# Patient Record
Sex: FEMALE | Race: WHITE | Employment: OTHER | URBAN - METROPOLITAN AREA
[De-identification: names, ages, dates, MRNs, and addresses within clinical notes are randomized per-mention and may not be internally consistent; named-entity substitution may affect disease eponyms.]

---

## 2017-02-17 DIAGNOSIS — Z00.00 ROUTINE GENERAL MEDICAL EXAMINATION AT A HEALTH CARE FACILITY: ICD-10-CM

## 2017-02-17 LAB
A/G RATIO: 1.5 (ref 1.1–2.2)
ALBUMIN SERPL-MCNC: 4.5 G/DL (ref 3.4–5)
ALP BLD-CCNC: 72 U/L (ref 40–129)
ALT SERPL-CCNC: 16 U/L (ref 10–40)
ANION GAP SERPL CALCULATED.3IONS-SCNC: 14 MMOL/L (ref 3–16)
AST SERPL-CCNC: 19 U/L (ref 15–37)
BASOPHILS ABSOLUTE: 0 K/UL (ref 0–0.2)
BASOPHILS RELATIVE PERCENT: 0.9 %
BILIRUB SERPL-MCNC: <0.2 MG/DL (ref 0–1)
BUN BLDV-MCNC: 14 MG/DL (ref 7–20)
CALCIUM SERPL-MCNC: 9.6 MG/DL (ref 8.3–10.6)
CHLORIDE BLD-SCNC: 102 MMOL/L (ref 99–110)
CHOLESTEROL, TOTAL: 250 MG/DL (ref 0–199)
CO2: 27 MMOL/L (ref 21–32)
CREAT SERPL-MCNC: 0.8 MG/DL (ref 0.6–1.1)
EOSINOPHILS ABSOLUTE: 0.2 K/UL (ref 0–0.6)
EOSINOPHILS RELATIVE PERCENT: 4.4 %
GFR AFRICAN AMERICAN: >60
GFR NON-AFRICAN AMERICAN: >60
GLOBULIN: 3 G/DL
GLUCOSE BLD-MCNC: 96 MG/DL (ref 70–99)
HCT VFR BLD CALC: 43.6 % (ref 36–48)
HDLC SERPL-MCNC: 63 MG/DL (ref 40–60)
HEMOGLOBIN: 14.4 G/DL (ref 12–16)
HEPATITIS C ANTIBODY INTERPRETATION: NORMAL
LDL CHOLESTEROL CALCULATED: 173 MG/DL
LYMPHOCYTES ABSOLUTE: 1.8 K/UL (ref 1–5.1)
LYMPHOCYTES RELATIVE PERCENT: 34.8 %
MCH RBC QN AUTO: 31.6 PG (ref 26–34)
MCHC RBC AUTO-ENTMCNC: 33 G/DL (ref 31–36)
MCV RBC AUTO: 95.8 FL (ref 80–100)
MONOCYTES ABSOLUTE: 0.4 K/UL (ref 0–1.3)
MONOCYTES RELATIVE PERCENT: 6.9 %
NEUTROPHILS ABSOLUTE: 2.7 K/UL (ref 1.7–7.7)
NEUTROPHILS RELATIVE PERCENT: 53 %
PDW BLD-RTO: 12.5 % (ref 12.4–15.4)
PLATELET # BLD: 244 K/UL (ref 135–450)
PMV BLD AUTO: 8.8 FL (ref 5–10.5)
POTASSIUM SERPL-SCNC: 4.6 MMOL/L (ref 3.5–5.1)
RBC # BLD: 4.55 M/UL (ref 4–5.2)
SODIUM BLD-SCNC: 143 MMOL/L (ref 136–145)
TOTAL PROTEIN: 7.5 G/DL (ref 6.4–8.2)
TRIGL SERPL-MCNC: 71 MG/DL (ref 0–150)
TSH REFLEX: 2.82 UIU/ML (ref 0.27–4.2)
VLDLC SERPL CALC-MCNC: 14 MG/DL
WBC # BLD: 5.1 K/UL (ref 4–11)

## 2018-03-07 DIAGNOSIS — Z00.00 ROUTINE GENERAL MEDICAL EXAMINATION AT A HEALTH CARE FACILITY: ICD-10-CM

## 2018-03-07 LAB
A/G RATIO: 1.6 (ref 1.1–2.2)
ALBUMIN SERPL-MCNC: 4.3 G/DL (ref 3.4–5)
ALP BLD-CCNC: 62 U/L (ref 40–129)
ALT SERPL-CCNC: 21 U/L (ref 10–40)
ANION GAP SERPL CALCULATED.3IONS-SCNC: 15 MMOL/L (ref 3–16)
AST SERPL-CCNC: 25 U/L (ref 15–37)
BASOPHILS ABSOLUTE: 0 K/UL (ref 0–0.2)
BASOPHILS RELATIVE PERCENT: 0.5 %
BILIRUB SERPL-MCNC: 0.4 MG/DL (ref 0–1)
BUN BLDV-MCNC: 10 MG/DL (ref 7–20)
CALCIUM SERPL-MCNC: 9 MG/DL (ref 8.3–10.6)
CHLORIDE BLD-SCNC: 100 MMOL/L (ref 99–110)
CHOLESTEROL, TOTAL: 235 MG/DL (ref 0–199)
CO2: 25 MMOL/L (ref 21–32)
CREAT SERPL-MCNC: 0.8 MG/DL (ref 0.6–1.1)
EOSINOPHILS ABSOLUTE: 0.1 K/UL (ref 0–0.6)
EOSINOPHILS RELATIVE PERCENT: 1.8 %
GFR AFRICAN AMERICAN: >60
GFR NON-AFRICAN AMERICAN: >60
GLOBULIN: 2.7 G/DL
GLUCOSE BLD-MCNC: 83 MG/DL (ref 70–99)
HCT VFR BLD CALC: 41.9 % (ref 36–48)
HDLC SERPL-MCNC: 76 MG/DL (ref 40–60)
HEMOGLOBIN: 13.9 G/DL (ref 12–16)
LDL CHOLESTEROL CALCULATED: 142 MG/DL
LYMPHOCYTES ABSOLUTE: 1.8 K/UL (ref 1–5.1)
LYMPHOCYTES RELATIVE PERCENT: 30.2 %
MAGNESIUM: 2.1 MG/DL (ref 1.8–2.4)
MCH RBC QN AUTO: 32.7 PG (ref 26–34)
MCHC RBC AUTO-ENTMCNC: 33.2 G/DL (ref 31–36)
MCV RBC AUTO: 98.4 FL (ref 80–100)
MONOCYTES ABSOLUTE: 0.4 K/UL (ref 0–1.3)
MONOCYTES RELATIVE PERCENT: 7.1 %
NEUTROPHILS ABSOLUTE: 3.6 K/UL (ref 1.7–7.7)
NEUTROPHILS RELATIVE PERCENT: 60.4 %
PDW BLD-RTO: 13 % (ref 12.4–15.4)
PLATELET # BLD: 239 K/UL (ref 135–450)
PMV BLD AUTO: 9.1 FL (ref 5–10.5)
POTASSIUM SERPL-SCNC: 4 MMOL/L (ref 3.5–5.1)
RBC # BLD: 4.25 M/UL (ref 4–5.2)
SODIUM BLD-SCNC: 140 MMOL/L (ref 136–145)
TOTAL PROTEIN: 7 G/DL (ref 6.4–8.2)
TRIGL SERPL-MCNC: 84 MG/DL (ref 0–150)
TSH REFLEX: 3.76 UIU/ML (ref 0.27–4.2)
VLDLC SERPL CALC-MCNC: 17 MG/DL
WBC # BLD: 6 K/UL (ref 4–11)

## 2018-08-14 PROBLEM — S90.32XA CONTUSION OF LEFT FOOT: Status: ACTIVE | Noted: 2018-08-14

## 2019-06-12 DIAGNOSIS — M25.50 ARTHRALGIA, UNSPECIFIED JOINT: ICD-10-CM

## 2019-06-12 PROBLEM — S90.32XA CONTUSION OF LEFT FOOT: Status: RESOLVED | Noted: 2018-08-14 | Resolved: 2019-06-12

## 2019-06-12 LAB
RHEUMATOID FACTOR: <10 IU/ML
SEDIMENTATION RATE, ERYTHROCYTE: 20 MM/HR (ref 0–30)

## 2019-06-13 LAB — ANTI-NUCLEAR ANTIBODY (ANA): NEGATIVE

## 2020-06-18 DIAGNOSIS — Z01.818 PREOPERATIVE GENERAL PHYSICAL EXAMINATION: ICD-10-CM

## 2020-06-18 LAB
ANION GAP SERPL CALCULATED.3IONS-SCNC: 15 MMOL/L (ref 3–16)
BASOPHILS ABSOLUTE: 0 K/UL (ref 0–0.2)
BASOPHILS RELATIVE PERCENT: 0.9 %
BUN BLDV-MCNC: 11 MG/DL (ref 7–20)
CALCIUM SERPL-MCNC: 9.6 MG/DL (ref 8.3–10.6)
CHLORIDE BLD-SCNC: 103 MMOL/L (ref 99–110)
CO2: 24 MMOL/L (ref 21–32)
CREAT SERPL-MCNC: 1 MG/DL (ref 0.6–1.1)
EOSINOPHILS ABSOLUTE: 0.2 K/UL (ref 0–0.6)
EOSINOPHILS RELATIVE PERCENT: 4.1 %
GFR AFRICAN AMERICAN: >60
GFR NON-AFRICAN AMERICAN: 57
GLUCOSE BLD-MCNC: 88 MG/DL (ref 70–99)
HCT VFR BLD CALC: 43.6 % (ref 36–48)
HEMOGLOBIN: 14.5 G/DL (ref 12–16)
LYMPHOCYTES ABSOLUTE: 1.3 K/UL (ref 1–5.1)
LYMPHOCYTES RELATIVE PERCENT: 25.6 %
MCH RBC QN AUTO: 31.9 PG (ref 26–34)
MCHC RBC AUTO-ENTMCNC: 33.1 G/DL (ref 31–36)
MCV RBC AUTO: 96.1 FL (ref 80–100)
MONOCYTES ABSOLUTE: 0.3 K/UL (ref 0–1.3)
MONOCYTES RELATIVE PERCENT: 5.4 %
NEUTROPHILS ABSOLUTE: 3.2 K/UL (ref 1.7–7.7)
NEUTROPHILS RELATIVE PERCENT: 64 %
PDW BLD-RTO: 13.2 % (ref 12.4–15.4)
PLATELET # BLD: 208 K/UL (ref 135–450)
PMV BLD AUTO: 10 FL (ref 5–10.5)
POTASSIUM SERPL-SCNC: 4.1 MMOL/L (ref 3.5–5.1)
RBC # BLD: 4.54 M/UL (ref 4–5.2)
SODIUM BLD-SCNC: 142 MMOL/L (ref 136–145)
WBC # BLD: 5.1 K/UL (ref 4–11)

## 2020-09-22 DIAGNOSIS — Z00.00 ROUTINE GENERAL MEDICAL EXAMINATION AT A HEALTH CARE FACILITY: ICD-10-CM

## 2020-09-22 LAB
ALBUMIN SERPL-MCNC: 4.6 G/DL (ref 3.4–5)
ALP BLD-CCNC: 58 U/L (ref 40–129)
ALT SERPL-CCNC: 16 U/L (ref 10–40)
AST SERPL-CCNC: 23 U/L (ref 15–37)
BILIRUB SERPL-MCNC: 0.4 MG/DL (ref 0–1)
BILIRUBIN DIRECT: <0.2 MG/DL (ref 0–0.3)
BILIRUBIN, INDIRECT: NORMAL MG/DL (ref 0–1)
CHOLESTEROL, TOTAL: 234 MG/DL (ref 0–199)
HDLC SERPL-MCNC: 62 MG/DL (ref 40–60)
LDL CHOLESTEROL CALCULATED: 158 MG/DL
TOTAL PROTEIN: 7.1 G/DL (ref 6.4–8.2)
TRIGL SERPL-MCNC: 69 MG/DL (ref 0–150)
TSH REFLEX: 2.15 UIU/ML (ref 0.27–4.2)
VLDLC SERPL CALC-MCNC: 14 MG/DL

## 2023-02-03 ENCOUNTER — HOSPITAL ENCOUNTER (OUTPATIENT)
Age: 63
Discharge: HOME | End: 2023-02-03
Payer: COMMERCIAL

## 2023-02-03 ENCOUNTER — HOSPITAL ENCOUNTER (OUTPATIENT)
Dept: GENERAL RADIOLOGY | Age: 63
Discharge: HOME | End: 2023-02-03
Payer: COMMERCIAL

## 2023-02-03 DIAGNOSIS — M25.531 RIGHT WRIST PAIN: ICD-10-CM

## 2023-02-03 PROCEDURE — 73110 X-RAY EXAM OF WRIST: CPT

## 2023-02-15 ENCOUNTER — OFFICE VISIT (OUTPATIENT)
Dept: ORTHOPEDIC SURGERY | Age: 63
End: 2023-02-15

## 2023-02-15 VITALS — BODY MASS INDEX: 22.53 KG/M2 | HEIGHT: 64 IN | WEIGHT: 132 LBS

## 2023-02-15 DIAGNOSIS — M25.531 RIGHT WRIST PAIN: Primary | ICD-10-CM

## 2023-02-15 NOTE — PROGRESS NOTES
Chief Complaint    Wrist Pain (right)      History of Present Illness:  Zuly Riddle is a 58 y.o. female who presents today for evaluation of right ulnar wrist pain. Patient states that approximately 3 weeks ago she was getting up from the couch pushing down onto her right wrist when she had acute pain over the ulnar aspect of the right wrist.  She has a history of a prior right wrist injury and October 2022 in which she had x-rays which showed mild degenerative changes with no acute fractures or dislocations. She is states that she is presently wearing a wrist brace which she states helps control her symptoms within the right wrist.  Pain is mainly over the distal ulnar styloid into the carpal bones and increased with supination and pronation and with ulnar deviation. She denies any other pain within the right wrist.  Pain Assessment  Location of Pain: Wrist  Location Modifiers: Right  Severity of Pain: 8  Quality of Pain: Other (Comment)  Duration of Pain: Other (Comment)  Frequency of Pain: Other (Comment)    Medical History:  Patient's medications, allergies, past medical, surgical, social and family histories were reviewed and updated as appropriate. Review of Systems:  Pertinent items are noted in HPI  Review of systems reviewed from Patient History Form dated on 2/15/2023 and available in the patient's chart under the Media tab. Vital Signs:  Ht 5' 4.02\" (1.626 m)   Wt 132 lb (59.9 kg)   BMI 22.65 kg/m²     General Exam:   Constitutional: Patient is adequately groomed with no evidence of malnutrition  Mental Status: The patient is oriented to time, place and person. The patient's mood and affect are appropriate. Neurological: The patient has good coordination. There is no weakness or sensory deficit. Right wrist examination:    Inspection: Right wrist inspection reveals the skin to be intact with no obvious deformity, erythema, ecchymosis.     Palpation: She is tender to palpation over the distal ulnar styloid along the ulnar collateral ligament into the TFCC    Range of Motion: Decreased to full supination, pronation, ulnar and radial deviation    Strength: Decreased in all planes secondary to pain    Special Tests: Distal neurovascular status is grossly intact    Skin: There are no rashes, ulcerations or lesions. Capillary refills within 2 seconds    Radiology:     X-rays obtained on 2/3/2023 were independently reviewed with the patient which shows no evidence of acute fracture or dislocation. There are no osseous lesions or focal soft tissue anomalies. Assessment : Ulnar collateral ligament pain versus TFCC tear      Office Procedures:  No orders of the defined types were placed in this encounter. Treatment Plan: Today we discussed the diagnosis and treatment plan and the patient is to continue with the wrist brace at all times other than bathing and she may take it off for sleeping. We are going to obtain an MRI of the right wrist to further evaluate the TFCC tear versus ulnar collateral ligament tear. Follow-up: After the MRI we will contact the patient with the results and if there is surgical pathology she will be referred to the hand team further continue evaluation care.               Lobo Christianson PA-C  Board certified by the [facility]

## 2023-02-22 ENCOUNTER — TELEPHONE (OUTPATIENT)
Dept: ORTHOPEDIC SURGERY | Age: 63
End: 2023-02-22

## 2023-02-24 ENCOUNTER — TELEPHONE (OUTPATIENT)
Dept: ORTHOPEDIC SURGERY | Age: 63
End: 2023-02-24

## 2023-07-18 ENCOUNTER — HOSPITAL ENCOUNTER (OUTPATIENT)
Age: 63
Discharge: HOME | End: 2023-07-18

## 2023-07-18 ENCOUNTER — HOSPITAL ENCOUNTER (OUTPATIENT)
Dept: CT IMAGING | Age: 63
Discharge: HOME | End: 2023-07-18
Payer: COMMERCIAL

## 2023-07-18 ENCOUNTER — HOSPITAL ENCOUNTER (OUTPATIENT)
Dept: MRI IMAGING | Age: 63
Discharge: HOME | End: 2023-07-18
Payer: COMMERCIAL

## 2023-07-18 DIAGNOSIS — M48.061 LUMBAR STENOSIS WITHOUT NEUROGENIC CLAUDICATION: ICD-10-CM

## 2023-07-18 DIAGNOSIS — M50.223 OTHER CERVICAL DISC DISPLACEMENT AT C6-C7 LEVEL: ICD-10-CM

## 2023-07-18 PROCEDURE — 72125 CT NECK SPINE W/O DYE: CPT

## 2023-07-18 PROCEDURE — 72148 MRI LUMBAR SPINE W/O DYE: CPT

## 2023-11-08 ENCOUNTER — TRANSCRIBE ORDERS (OUTPATIENT)
Dept: ADMINISTRATIVE | Age: 63
End: 2023-11-08

## 2023-11-08 DIAGNOSIS — M48.02 SPINAL STENOSIS IN CERVICAL REGION: Primary | ICD-10-CM

## 2023-11-15 ENCOUNTER — HOSPITAL ENCOUNTER (OUTPATIENT)
Dept: MRI IMAGING | Age: 63
Discharge: HOME | End: 2023-11-15
Payer: COMMERCIAL

## 2023-11-15 DIAGNOSIS — M48.02 SPINAL STENOSIS IN CERVICAL REGION: ICD-10-CM

## 2023-11-15 PROCEDURE — 72141 MRI NECK SPINE W/O DYE: CPT

## 2024-02-20 ENCOUNTER — TELEPHONE (OUTPATIENT)
Dept: ENT CLINIC | Age: 64
End: 2024-02-20

## 2024-02-20 NOTE — TELEPHONE ENCOUNTER
Patient called in asking if we accept workers comp as she had cervical fusion surgery and since then she has had trouble swallowing.     Informed patient I would find out for sure and give her a call back to get her an appointment if it is needed.

## 2024-02-20 NOTE — TELEPHONE ENCOUNTER
Spoke to Mery who stated we do accept workers comp if we have a referral and the patient has approval to see our office.    Call placed to patient and informed her of this. She stated her surgeon told her to find and ENT who accepts WC and they would file a C9. Informed patient we would also need a referral sent to our office and provided her with the fax number to have the referral sent to.

## 2024-02-22 NOTE — TELEPHONE ENCOUNTER
Call placed to patient to inform her we received an approval from workers comp for a consult with ENT.     ADI asking patient to call back to set up appointment.    Paperwork has been scanned into patient chart.

## 2024-03-20 ENCOUNTER — OFFICE VISIT (OUTPATIENT)
Dept: ENT CLINIC | Age: 64
End: 2024-03-20

## 2024-03-20 VITALS
BODY MASS INDEX: 28.17 KG/M2 | WEIGHT: 165 LBS | HEART RATE: 66 BPM | DIASTOLIC BLOOD PRESSURE: 68 MMHG | HEIGHT: 64 IN | SYSTOLIC BLOOD PRESSURE: 118 MMHG

## 2024-03-20 DIAGNOSIS — R13.14 PHARYNGOESOPHAGEAL DYSPHAGIA: Primary | ICD-10-CM

## 2024-03-20 RX ORDER — HYDRALAZINE HYDROCHLORIDE 25 MG/1
25 TABLET, FILM COATED ORAL 3 TIMES DAILY
COMMUNITY

## 2024-03-20 RX ORDER — CHOLECALCIFEROL (VITAMIN D3) 1250 MCG
50000 CAPSULE ORAL DAILY
COMMUNITY

## 2024-03-20 RX ORDER — CYANOCOBALAMIN (VITAMIN B-12) 500 MCG
TABLET ORAL
COMMUNITY

## 2024-03-20 RX ORDER — OMEPRAZOLE 20 MG/1
20 CAPSULE, DELAYED RELEASE ORAL DAILY
COMMUNITY

## 2024-03-20 ASSESSMENT — ENCOUNTER SYMPTOMS
COUGH: 0
APNEA: 0
SINUS PRESSURE: 0
TROUBLE SWALLOWING: 0
FACIAL SWELLING: 0
SHORTNESS OF BREATH: 0
EYE ITCHING: 0
SORE THROAT: 0
VOICE CHANGE: 0

## 2024-03-20 NOTE — PROGRESS NOTES
[facility]  [address], [address]  [place] [address]  P: [phone]       Patient     Zuly Riddle  1960    ChiefComplaint     Chief Complaint   Patient presents with    New Patient     Trouble swallowing since cervical fusion Jasbir 15.         History of Present Illness     Zuly is a 63-year-old female here today for evaluation of dysphagia.  Had ACDF done in January 2023 and since surgery has had difficulty with swallowing.  This is her fourth cervical spine surgery.  States overall she can swallow liquids and soft foods without difficulty but has trouble swallowing meats and breads.  Her surgeon recommended ENT evaluation prior to speech therapy.    Past Medical History     Past Medical History:   Diagnosis Date    Cervicalgia     Fibromyalgia        Past Surgical History     Past Surgical History:   Procedure Laterality Date    CHOLECYSTECTOMY      HYSTERECTOMY (CERVIX STATUS UNKNOWN)      OVARY REMOVAL         Family History     Family History   Problem Relation Age of Onset    Breast Cancer Maternal Grandmother        Social History     Social History     Tobacco Use    Smoking status: Never    Smokeless tobacco: Never   Substance Use Topics    Alcohol use: No        Allergies     Allergies   Allergen Reactions    Iodinated Contrast Media Shortness Of Breath    Dicyclomine     Iodine (Kelp) [Iodine-Kelp]     Oxycodone     Tetracycline Other (See Comments) and Hives    Vancomycin     Adhesive Tape Rash     BLISTERS    Morphine Nausea And Vomiting and Other (See Comments)    Oxycodone-Acetaminophen Nausea And Vomiting     OK W TYLENOL       Medications     Current Outpatient Medications   Medication Sig Dispense Refill    hydrALAZINE (APRESOLINE) 25 MG tablet Take 1 tablet by mouth 3 times daily      omeprazole (PRILOSEC) 20 MG delayed release capsule Take 1 capsule by mouth daily      Menaquinone-7 (K2 PO) Take by mouth      Omega-3 Fatty Acids (FISH OIL) 300 MG CAPS Take by

## 2024-03-26 ENCOUNTER — HOSPITAL ENCOUNTER (OUTPATIENT)
Dept: GENERAL RADIOLOGY | Age: 64
Discharge: HOME | End: 2024-03-26

## 2024-03-26 ENCOUNTER — HOSPITAL ENCOUNTER (OUTPATIENT)
Dept: SPEECH THERAPY | Age: 64
Setting detail: THERAPIES SERIES
Discharge: HOME | End: 2024-03-26

## 2024-03-26 ENCOUNTER — TELEPHONE (OUTPATIENT)
Dept: ENT CLINIC | Age: 64
End: 2024-03-26

## 2024-03-26 DIAGNOSIS — R13.14 PHARYNGOESOPHAGEAL DYSPHAGIA: ICD-10-CM

## 2024-03-26 DIAGNOSIS — R13.14 PHARYNGOESOPHAGEAL DYSPHAGIA: Primary | ICD-10-CM

## 2024-03-26 PROCEDURE — 74230 X-RAY XM SWLNG FUNCJ C+: CPT

## 2024-03-26 PROCEDURE — 92611 MOTION FLUOROSCOPY/SWALLOW: CPT

## 2024-03-26 PROCEDURE — 92526 ORAL FUNCTION THERAPY: CPT

## 2024-03-26 NOTE — PROCEDURES
I did find a case study wherein an osteophyte had grown into the cervical hardware and irritated the vagus nerve resulting in chronic cough. This was corrected by resection and laryngeal manipulation externally. Pt does have hx of osteophytosis impressing upon the hypopharynx during previous MBS. Given hx and chronic nature of complaints, continue to recommend follow-up with surgeon to offer possible solutions and to correlate clinical findings. Could also due trial therapy of chronic cough treatment for possible reduction and improved management of symptoms. Communicated findings with Dr. Ontiveros.       Patient presents as no aspiration risk and no PNA / adverse pulmonary event risk given the following factors via BOLUS Framework (YANETH Horne & Louie, A.H. (2021):  N/A- Pt does not exhibit risk factors associated with adverse pulmonary events resulting from aspiration         Diet Recommendation: IDDSI 7 Regular Solids ; IDDSI 0 Thin Liquids; Meds whole with thin liquids  Risk Management: upright for all intake, stay upright for at least 30 mins after intake, small bites/sips, oral care 2-3x/day to reduce adverse affects in the event of aspiration, increase physical mobility as able, alternate bites/sips, slow rate of intake, general GERD precautions, and general aspiration precautions  Specialist Referrals: N/A  Ancillary Tests: N/A  Goals: Tolerate LRD  Follow-up exam: Follow-up with SLP is recommended    Diagnosis Code: R13.13- pharyngeal dysphagia    Education   SLP educated the patient re: Role of SLP, rationale for completion of assessment, anatomical components of swallow structures as they pertain to airway protection, results of assessment, recommendations, POC, and rationale for MBS  Response to education:   [x] Verbalized Understanding  [] Demonstrated Understanding  [] No evidence of learning  [] Ongoing education is recommended  [] Caregiver aware of results and recommendations  [] RN aware of

## 2024-03-26 NOTE — TELEPHONE ENCOUNTER
Spoke with Zuly regarding results of MBS.  Plan for speech therapy to assess for whether we can help suppress vagal response.  Discussed if it is not amenable to therapy we do have the option of medication to try and help calm down and irritated vagal nerve.

## 2024-03-29 ENCOUNTER — HOSPITAL ENCOUNTER (OUTPATIENT)
Dept: MAMMOGRAPHY | Age: 64
Discharge: HOME | End: 2024-03-29
Payer: COMMERCIAL

## 2024-03-29 DIAGNOSIS — Z12.31 SCREENING MAMMOGRAM, ENCOUNTER FOR: ICD-10-CM

## 2024-03-29 PROCEDURE — 77063 BREAST TOMOSYNTHESIS BI: CPT

## 2024-04-23 ENCOUNTER — HOSPITAL ENCOUNTER (OUTPATIENT)
Dept: SPEECH THERAPY | Age: 64
Setting detail: THERAPIES SERIES
Discharge: HOME | End: 2024-04-23
Payer: COMMERCIAL

## 2024-04-23 DIAGNOSIS — R05.3 CHRONIC COUGH: Primary | ICD-10-CM

## 2024-04-23 PROCEDURE — 92610 EVALUATE SWALLOWING FUNCTION: CPT

## 2024-04-23 NOTE — PLAN OF CARE
[facility]- [facility]patient [facility] and Therapy [address] [place] [address] [place] [address] office: [phone] fax: [phone]     Speech Therapy Clinical Bedside Swallow Assessment/Certification      Dear Brenda Ontiveros DO,    We had the pleasure of evaluating the following patient for speech therapy services at [facility].  A summary of our findings can be found in the initial assessment below.  This includes our plan of care.  If you have any questions or concerns regarding these findings, please do not hesitate to contact me at the office phone number listed above.  Thank you for the referral.     Physician Signature:_______________________________Date:__________________  By signing above (or electronic signature), therapist’s plan is approved by physician       Speech Therapy: Initial Evaluation   Patient: Zuly Riddle (63 y.o. female)   Examination Date: 2024   :  1960 MRN: [id number]   Visit #: 1   Insurance: Payor: [id number] / Plan: [id number] / Product Type: *No Product type* /   Insurance ID: [id number] - (Worker's Comp)  Secondary Insurance (if applicable):    Treatment Diagnosis:  Chronic cough [R05.3],    Medical Diagnosis:  Pharyngoesophageal dysphagia [R13.14]   Referring Physician: Brenda Ontiveros DO  PCP: Paula Ugalde APRN - CNP                              Precautions/ Contra-indications:   Latex allergy:   No  Pacemaker:     No  Other:   N/A    Red Flags:  None    C-SSRS Triggered by Intake questionnaire:   [x] No, Questionnaire did not trigger screening.   [] Yes, Patient intake triggered further evaluation      [] C-SSRS Screening completed  [] PCP notified via Plan of Care  [] Emergency services notified     Falls Risk Assessment (30 days):  [x] Deferred to PT/OT as part of multidisciplinary program.  [] Falls Risk assessed and no intervention required.  [] Falls risk assessed (via clinical reasoning) and patient requires

## 2024-05-09 ENCOUNTER — HOSPITAL ENCOUNTER (OUTPATIENT)
Dept: GENERAL RADIOLOGY | Age: 64
Discharge: HOME | End: 2024-05-09
Payer: COMMERCIAL

## 2024-05-09 DIAGNOSIS — M81.0 POSTMENOPAUSAL OSTEOPOROSIS: ICD-10-CM

## 2024-05-09 PROCEDURE — 77080 DXA BONE DENSITY AXIAL: CPT

## 2024-05-15 ENCOUNTER — TELEPHONE (OUTPATIENT)
Dept: ENT CLINIC | Age: 64
End: 2024-05-15

## 2024-05-15 NOTE — TELEPHONE ENCOUNTER
Ashlyn chahal stated they sent a request 5/1 for updated C 9 for speech therapy - with frequency and duration information. They need it faxed back to [phone]       Rep stated that Dr. Ontiveros is lapsed in coverage with [facility] would need to be reinstated. Address is showing on [facility] as well so info needs updated, which could be why we didn't get the fax request they sent on 5/1.

## 2024-05-23 NOTE — TELEPHONE ENCOUNTER
I spoke to Donna from the 's office today and she would like to know the status of Dr. Ontiveros's coverage.  They need everything completed prior to the case going to trial on June 7th.  I let her know I contacted someone at [facility] and it is being looked into.  I will call when I have an answer.. Donna [phone]

## 2024-06-13 ENCOUNTER — HOSPITAL ENCOUNTER (OUTPATIENT)
Dept: SPEECH THERAPY | Age: 64
Setting detail: THERAPIES SERIES
Discharge: HOME | End: 2024-06-13
Payer: COMMERCIAL

## 2024-06-13 PROCEDURE — 92526 ORAL FUNCTION THERAPY: CPT

## 2024-06-13 NOTE — FLOWSHEET NOTE
[facility]- Outpatient [facility] Therapy [address] [place] [address] [place] [address] office: [phone] fax: [phone]     Speech Therapy Daily Note    Speech Therapy: Daily Note   Patient: Zuly Riddle (63 y.o. female)   Examination Date: 2024   :  1960 MRN: [id number]   Visit #:    Insurance Allowable Auth Needed   1 per week, up to 6 weeks []Yes    []No    Insurance: Payor: [employer] / Plan: [employer] / Product Type: *No Product type* /   Insurance ID: [id number] - (Worker's Comp)  Secondary Insurance (if applicable):    Treatment Diagnosis:  Chronic cough [R05.3],    Medical Diagnosis:  Pharyngoesophageal dysphagia [R13.14]   Referring Physician: Brenda Ontiveros DO  PCP: Paula Ugalde APRN - CNP     Plan of care signed: YES    Date of Patient follow up with Physician: FRANCISCO    Functional Outcome Measure: Cough Severity Index (CSI) (Shembel et al., 2013): 12     Progress Report/POC: YES, Date Range for this report: 24 to 24  POC update due: 1/10 or  24     Precautions/ Contra-indications:   Latex allergy:   No  Pacemaker:     No  Other:                    N/A     Preferred Language for Healthcare:   [x]English       []other:    SUBJECTIVE EXAMINATION     Patient Report/Comments: The patient does not complain of pain. Pt reported they have significantly decreased the frequency and severity of their coughing episodes.     Location: Pt seen in therapy office  Demeanor: Pleasant  and Cooperative  Motivated: [x]  Yes  []  No   Caregivers Present: []  Yes  [x]  No       OBJECTIVE EXAMINATION   Exercises/Interventions:   Dysphagia Therapy (48750)  Reps Seconds Notes   Vocal Function Interventions:      Semi-Occluded Vocal Tract SOVT: Laxvox/bubbles   Treatment to increase the interaction between airflow from the vibratory mechanism and the resonance features and engage the voice production system across multiple levels (Grant, 2006)   Resonant Voice Therapy

## 2024-06-20 ENCOUNTER — HOSPITAL ENCOUNTER (OUTPATIENT)
Dept: SPEECH THERAPY | Age: 64
Setting detail: THERAPIES SERIES
Discharge: HOME | End: 2024-06-20
Payer: COMMERCIAL

## 2024-06-20 ENCOUNTER — HOSPITAL ENCOUNTER (OUTPATIENT)
Age: 64
Discharge: HOME | End: 2024-06-20
Payer: COMMERCIAL

## 2024-06-20 LAB
EKG ATRIAL RATE: 58 BPM
EKG DIAGNOSIS: NORMAL
EKG P AXIS: 66 DEGREES
EKG P-R INTERVAL: 172 MS
EKG Q-T INTERVAL: 460 MS
EKG QRS DURATION: 84 MS
EKG QTC CALCULATION (BAZETT): 451 MS
EKG R AXIS: -63 DEGREES
EKG T AXIS: 50 DEGREES
EKG VENTRICULAR RATE: 58 BPM

## 2024-06-20 PROCEDURE — 93005 ELECTROCARDIOGRAM TRACING: CPT | Performed by: PHYSICIAN ASSISTANT

## 2024-06-20 PROCEDURE — 92526 ORAL FUNCTION THERAPY: CPT

## 2024-06-20 NOTE — FLOWSHEET NOTE
Therapy:    [x]  (84202) Treatment of swallowing dysfunction and/or oral function for feeding       Total Treatment Time / Charges :    Time in Time out Total Time / units   Cognitive Tx         Speech/Voice Tx      Dysphagia Tx 0945 1020 35 minutes / 1 unit          TREATMENT PLAN      Continue with current POC      Electronically Signed by TREY Ovalle              Date: 06/20/2024     Note: If patient does not return for scheduled/ recommended follow up visits, this note will serve as a discharge from care along with most recent update on progress.

## 2024-06-23 ENCOUNTER — HOSPITAL ENCOUNTER (OUTPATIENT)
Dept: CT IMAGING | Age: 64
Discharge: HOME | End: 2024-06-23
Payer: COMMERCIAL

## 2024-06-23 DIAGNOSIS — M50.321 DEGENERATION OF INTERVERTEBRAL DISC AT C4-C5 LEVEL: ICD-10-CM

## 2024-06-23 DIAGNOSIS — M50.222 HERNIATED NUCLEUS PULPOSUS, C5-6: ICD-10-CM

## 2024-06-23 DIAGNOSIS — M50.322 DEGENERATION OF C5-C6 INTERVERTEBRAL DISC: ICD-10-CM

## 2024-06-23 PROCEDURE — 72125 CT NECK SPINE W/O DYE: CPT

## 2024-06-25 ENCOUNTER — HOSPITAL ENCOUNTER (OUTPATIENT)
Dept: SPEECH THERAPY | Age: 64
Setting detail: THERAPIES SERIES
Discharge: HOME | End: 2024-06-25
Payer: COMMERCIAL

## 2024-06-25 PROCEDURE — 92526 ORAL FUNCTION THERAPY: CPT

## 2024-06-25 NOTE — FLOWSHEET NOTE
[facility]- Outpatient [facility]  [place] [address] office: [phone] fax: [phone]     Speech Therapy Daily Note    Speech Therapy: Daily Note   Patient: Zuly Riddle (63 y.o. female)   Examination Date: 2024   :  1960 MRN: [id number]   Visit #:    Insurance Allowable Auth Needed   1 per week, up to 6 weeks []Yes    []No    Insurance: Payor: [employer] / Plan: [employer] / Product Type: *No Product type* /   Insurance ID: [id number] - (Worker's Comp)  Secondary Insurance (if applicable):    Treatment Diagnosis:  Chronic cough [R05.3],    Medical Diagnosis:  Pharyngoesophageal dysphagia [R13.14]   Referring Physician: Brenda Ontiveros DO  PCP: Paula Ugalde APRN - CNP     Plan of care signed: YES    Date of Patient follow up with Physician: FRANCISCO    Functional Outcome Measure: Cough Severity Index (CSI) (Shembel et al., 2013): 12     Progress Report/POC: NO  POC update due: 4/10 or  24     Precautions/ Contra-indications:   Latex allergy:   No  Pacemaker:     No  Other:                    N/A     Preferred Language for Healthcare:   [x]English       []other:    SUBJECTIVE EXAMINATION     Patient Report/Comments: The patient does not complain of pain. Pt reported they are doing well with the at home exs program and seldom cough unless they feel a slight tickle in their throat.     Location: Pt seen in therapy office  Demeanor: Pleasant  and Cooperative  Motivated: [x]  Yes  []  No   Caregivers Present: []  Yes  [x]  No       OBJECTIVE EXAMINATION   Exercises/Interventions:   Dysphagia Therapy (68455)  Reps Seconds Notes   Vocal Function Interventions:      Semi-Occluded Vocal Tract SOVT: Laxvox/bubbles, 20 reps   Treatment to increase the interaction between airflow from the vibratory mechanism and the resonance features and engage the voice production system across multiple levels (Grant, 2006)   Resonant Voice Therapy   Treatment to

## 2024-07-02 ENCOUNTER — HOSPITAL ENCOUNTER (OUTPATIENT)
Dept: SPEECH THERAPY | Age: 64
Setting detail: THERAPIES SERIES
Discharge: HOME | End: 2024-07-02
Payer: COMMERCIAL

## 2024-07-02 PROCEDURE — 92526 ORAL FUNCTION THERAPY: CPT

## 2024-07-02 NOTE — FLOWSHEET NOTE
dysfunction and/or oral function for feeding       Total Treatment Time / Charges :    Time in Time out Total Time / units   Cognitive Tx         Speech/Voice Tx      Dysphagia Tx 1415 1300 45 minutes / 1 unit          TREATMENT PLAN      Continue with current POC      Electronically Signed by TREY Ovalle              Date: 07/02/2024     Note: If patient does not return for scheduled/ recommended follow up visits, this note will serve as a discharge from care along with most recent update on progress.

## 2024-07-10 ENCOUNTER — HOSPITAL ENCOUNTER (OUTPATIENT)
Dept: SPEECH THERAPY | Age: 64
Setting detail: THERAPIES SERIES
Discharge: HOME | End: 2024-07-10
Payer: COMMERCIAL

## 2024-07-10 PROCEDURE — 92526 ORAL FUNCTION THERAPY: CPT

## 2024-07-10 NOTE — FLOWSHEET NOTE
[facility] [address] [place] [address] [place] [address] office: [phone] fax: [phone]     Speech Therapy Discharge Note    Speech Therapy: Daily Note   Patient: Zuly Riddle (63 y.o. female)   Examination Date: 07/10/2024   :  1960 MRN: [id number]   Visit #:    Insurance Allowable Auth Needed   1 per week, up to 6 weeks on 24  []Yes    []No    Insurance: Payor: [employer] / Plan: [employer] / Product Type: *No Product type* /   Insurance ID: [id number] - (Worker's Comp)  Secondary Insurance (if applicable):    Treatment Diagnosis:  Chronic cough [R05.3]    Medical Diagnosis:  Pharyngoesophageal dysphagia [R13.14]   Referring Physician: Brenda Ontiveros DO  PCP: Paula Ugalde APRN - CNP     Plan of care signed: YES    Date of Patient follow up with Physician: FRANCISCO    Functional Outcome Measure: Cough Severity Index (CSI) (Shembel et al., 2013): 12     Progress Report/POC: YES, Date Range for this report: 24 to 24  POC update due: 5/10 or  24     Precautions/ Contra-indications:   Latex allergy:   No  Pacemaker:     No  Other:                    N/A     Preferred Language for Healthcare:   [x]English       []other:    SUBJECTIVE EXAMINATION     Patient Report/Comments: The patient does not complain of pain. Pt reports they rarely feel a globus sensation.     Location: Pt seen in therapy office  Demeanor: Pleasant  and Cooperative  Motivated: [x]  Yes  []  No   Caregivers Present: []  Yes  [x]  No       OBJECTIVE EXAMINATION   Exercises/Interventions:   Dysphagia Therapy (61799)  Reps Seconds Notes   Vocal Function Interventions:      Semi-Occluded Vocal Tract SOVT: Laxvox/bubbles, 20 reps   Treatment to increase the interaction between airflow from the vibratory mechanism and the resonance features and engage the voice production system across multiple levels (Grant, 2006)   Resonant Voice Therapy   Treatment to position the vocal

## 2024-10-04 ENCOUNTER — HOSPITAL ENCOUNTER (OUTPATIENT)
Dept: MRI IMAGING | Age: 64
Discharge: HOME | End: 2024-10-04
Payer: COMMERCIAL

## 2024-10-04 DIAGNOSIS — Z98.1 S/P CERVICAL SPINAL FUSION: ICD-10-CM

## 2024-10-04 PROCEDURE — 72141 MRI NECK SPINE W/O DYE: CPT

## 2024-10-31 ENCOUNTER — HOSPITAL ENCOUNTER (OUTPATIENT)
Dept: CT IMAGING | Age: 64
Discharge: HOME | End: 2024-10-31
Payer: COMMERCIAL

## 2024-10-31 DIAGNOSIS — S12.9XXS LATE EFFECT OF FRACTURE OF CERVICAL VERTEBRA: ICD-10-CM

## 2024-10-31 PROCEDURE — 72125 CT NECK SPINE W/O DYE: CPT

## 2025-04-01 ENCOUNTER — HOSPITAL ENCOUNTER (OUTPATIENT)
Dept: MAMMOGRAPHY | Age: 65
Discharge: HOME | End: 2025-04-01
Payer: COMMERCIAL

## 2025-04-01 DIAGNOSIS — Z12.39 SCREENING BREAST EXAMINATION: ICD-10-CM

## 2025-04-01 PROCEDURE — 77063 BREAST TOMOSYNTHESIS BI: CPT

## 2025-04-03 ENCOUNTER — HOSPITAL ENCOUNTER (OUTPATIENT)
Dept: WOMENS IMAGING | Age: 65
End: 2025-04-03
Payer: COMMERCIAL

## 2025-04-03 ENCOUNTER — HOSPITAL ENCOUNTER (OUTPATIENT)
Dept: WOMENS IMAGING | Age: 65
Discharge: HOME | End: 2025-04-03
Payer: COMMERCIAL

## 2025-04-03 DIAGNOSIS — R92.8 ABNORMAL MAMMOGRAM: ICD-10-CM

## 2025-04-03 PROCEDURE — G0279 TOMOSYNTHESIS, MAMMO: HCPCS

## 2025-09-02 SDOH — HEALTH STABILITY: PHYSICAL HEALTH: ON AVERAGE, HOW MANY DAYS PER WEEK DO YOU ENGAGE IN MODERATE TO STRENUOUS EXERCISE (LIKE A BRISK WALK)?: 3 DAYS

## 2025-09-02 SDOH — HEALTH STABILITY: PHYSICAL HEALTH: ON AVERAGE, HOW MANY MINUTES DO YOU ENGAGE IN EXERCISE AT THIS LEVEL?: 60 MIN

## 2025-09-05 ENCOUNTER — OFFICE VISIT (OUTPATIENT)
Dept: FAMILY MEDICINE CLINIC | Age: 65
End: 2025-09-05
Payer: COMMERCIAL

## 2025-09-05 VITALS
SYSTOLIC BLOOD PRESSURE: 115 MMHG | DIASTOLIC BLOOD PRESSURE: 71 MMHG | WEIGHT: 166.4 LBS | HEIGHT: 64 IN | HEART RATE: 81 BPM | OXYGEN SATURATION: 95 % | TEMPERATURE: 98.3 F | BODY MASS INDEX: 28.41 KG/M2

## 2025-09-05 DIAGNOSIS — E55.9 VITAMIN D DEFICIENCY: ICD-10-CM

## 2025-09-05 DIAGNOSIS — Z83.3 FAMILY HISTORY OF DIABETES MELLITUS: ICD-10-CM

## 2025-09-05 DIAGNOSIS — K21.9 GASTROESOPHAGEAL REFLUX DISEASE WITHOUT ESOPHAGITIS: ICD-10-CM

## 2025-09-05 DIAGNOSIS — Z76.89 ENCOUNTER TO ESTABLISH CARE: Primary | ICD-10-CM

## 2025-09-05 DIAGNOSIS — M54.2 CERVICALGIA: ICD-10-CM

## 2025-09-05 DIAGNOSIS — B00.9 HERPES SIMPLEX: ICD-10-CM

## 2025-09-05 DIAGNOSIS — Z12.11 COLON CANCER SCREENING: ICD-10-CM

## 2025-09-05 DIAGNOSIS — E78.00 HYPERCHOLESTEREMIA: ICD-10-CM

## 2025-09-05 DIAGNOSIS — R30.0 BURNING WITH URINATION: ICD-10-CM

## 2025-09-05 LAB
BILIRUBIN, POC: NEGATIVE
BLOOD URINE, POC: NEGATIVE
CLARITY, POC: CLEAR
COLOR, POC: YELLOW
GLUCOSE URINE, POC: NEGATIVE MG/DL
KETONES, POC: NEGATIVE MG/DL
LEUKOCYTE EST, POC: NEGATIVE
NITRITE, POC: NEGATIVE
PH, POC: 6.5
PROTEIN, POC: NEGATIVE MG/DL
SPECIFIC GRAVITY, POC: 1.02
UROBILINOGEN, POC: 0.2 MG/DL

## 2025-09-05 PROCEDURE — 81002 URINALYSIS NONAUTO W/O SCOPE: CPT

## 2025-09-05 PROCEDURE — 3017F COLORECTAL CA SCREEN DOC REV: CPT

## 2025-09-05 PROCEDURE — G8427 DOCREV CUR MEDS BY ELIG CLIN: HCPCS

## 2025-09-05 PROCEDURE — 1036F TOBACCO NON-USER: CPT

## 2025-09-05 PROCEDURE — 99204 OFFICE O/P NEW MOD 45 MIN: CPT

## 2025-09-05 PROCEDURE — G8419 CALC BMI OUT NRM PARAM NOF/U: HCPCS

## 2025-09-05 RX ORDER — FAMOTIDINE 20 MG/1
20 TABLET, FILM COATED ORAL 2 TIMES DAILY
Qty: 60 TABLET | Refills: 1 | Status: SHIPPED | OUTPATIENT
Start: 2025-09-05 | End: 2025-09-05

## 2025-09-05 RX ORDER — HYDROXYZINE HYDROCHLORIDE 25 MG/ML
25 INJECTION, SOLUTION INTRAMUSCULAR
COMMUNITY
End: 2025-09-05

## 2025-09-05 RX ORDER — ROSUVASTATIN CALCIUM 5 MG/1
5 TABLET, COATED ORAL DAILY
Qty: 90 TABLET | Refills: 1 | Status: CANCELLED | OUTPATIENT
Start: 2025-09-05

## 2025-09-05 RX ORDER — HYDROXYZINE HYDROCHLORIDE 25 MG/1
50 TABLET, FILM COATED ORAL NIGHTLY PRN
COMMUNITY

## 2025-09-05 RX ORDER — ROSUVASTATIN CALCIUM 5 MG/1
5 TABLET, COATED ORAL DAILY
COMMUNITY

## 2025-09-05 RX ORDER — VALACYCLOVIR HYDROCHLORIDE 500 MG/1
500 TABLET, FILM COATED ORAL 2 TIMES DAILY
Qty: 10 TABLET | Refills: 0 | Status: SHIPPED | OUTPATIENT
Start: 2025-09-05 | End: 2025-09-10

## 2025-09-05 RX ORDER — CYCLOBENZAPRINE HCL 10 MG
10 TABLET ORAL 3 TIMES DAILY PRN
COMMUNITY

## 2025-09-05 SDOH — ECONOMIC STABILITY: FOOD INSECURITY: WITHIN THE PAST 12 MONTHS, YOU WORRIED THAT YOUR FOOD WOULD RUN OUT BEFORE YOU GOT MONEY TO BUY MORE.: NEVER TRUE

## 2025-09-05 SDOH — ECONOMIC STABILITY: FOOD INSECURITY: WITHIN THE PAST 12 MONTHS, THE FOOD YOU BOUGHT JUST DIDN'T LAST AND YOU DIDN'T HAVE MONEY TO GET MORE.: NEVER TRUE

## 2025-09-05 ASSESSMENT — ENCOUNTER SYMPTOMS
NAUSEA: 0
BLOOD IN STOOL: 0
EYE DISCHARGE: 0
SORE THROAT: 0
COUGH: 0
ABDOMINAL PAIN: 0
VOMITING: 0
RHINORRHEA: 0
SINUS PAIN: 0
ALLERGIC/IMMUNOLOGIC NEGATIVE: 1
SHORTNESS OF BREATH: 0

## 2025-09-05 ASSESSMENT — PATIENT HEALTH QUESTIONNAIRE - PHQ9
SUM OF ALL RESPONSES TO PHQ QUESTIONS 1-9: 0
2. FEELING DOWN, DEPRESSED OR HOPELESS: NOT AT ALL
SUM OF ALL RESPONSES TO PHQ QUESTIONS 1-9: 0
1. LITTLE INTEREST OR PLEASURE IN DOING THINGS: NOT AT ALL
SUM OF ALL RESPONSES TO PHQ QUESTIONS 1-9: 0
SUM OF ALL RESPONSES TO PHQ QUESTIONS 1-9: 0